# Patient Record
Sex: MALE
[De-identification: names, ages, dates, MRNs, and addresses within clinical notes are randomized per-mention and may not be internally consistent; named-entity substitution may affect disease eponyms.]

---

## 2018-06-08 ENCOUNTER — HOSPITAL ENCOUNTER (OUTPATIENT)
Dept: HOSPITAL 88 - OR | Age: 43
Discharge: HOME | End: 2018-06-08
Attending: INTERNAL MEDICINE
Payer: COMMERCIAL

## 2018-06-08 DIAGNOSIS — Z88.0: ICD-10-CM

## 2018-06-08 DIAGNOSIS — A04.72: Primary | ICD-10-CM

## 2018-06-08 DIAGNOSIS — K64.8: ICD-10-CM

## 2018-06-08 DIAGNOSIS — R03.0: ICD-10-CM

## 2018-06-08 DIAGNOSIS — Z80.0: ICD-10-CM

## 2018-06-08 DIAGNOSIS — K62.89: ICD-10-CM

## 2018-06-08 DIAGNOSIS — Z01.810: ICD-10-CM

## 2018-06-08 LAB — LACTOFERRIN STL QL: POSITIVE

## 2018-06-08 PROCEDURE — 87045 FECES CULTURE AEROBIC BACT: CPT

## 2018-06-08 PROCEDURE — 87177 OVA AND PARASITES SMEARS: CPT

## 2018-06-08 PROCEDURE — 45378 DIAGNOSTIC COLONOSCOPY: CPT

## 2018-06-08 PROCEDURE — 83630 LACTOFERRIN FECAL (QUAL): CPT

## 2018-06-08 PROCEDURE — 87493 C DIFF AMPLIFIED PROBE: CPT

## 2018-06-08 PROCEDURE — 45380 COLONOSCOPY AND BIOPSY: CPT

## 2018-06-08 PROCEDURE — 83993 ASSAY FOR CALPROTECTIN FECAL: CPT

## 2018-06-08 PROCEDURE — 93005 ELECTROCARDIOGRAM TRACING: CPT

## 2018-06-08 PROCEDURE — 87328 CRYPTOSPORIDIUM AG IA: CPT

## 2018-06-09 NOTE — OPERATIVE REPORT
DATE OF PROCEDURE:  June 08, 2018 



REFERRING PHYSICIAN:  Dr. Stoney Spivey 



PROCEDURE PERFORMED:  Colonoscopy with biopsies.  



INDICATIONS FOR COLONOSCOPY:  Diarrhea, lower abdominal pain.



MEDICATION:  Patient was done under MAC.  Please see anesthesiologist's 

note.



PROCEDURE:  With patient in left lateral decubitus position, flexible 

fiberoptic Olympus colonoscope was inserted into the rectum with ease and 

advanced all the way to the cecum.  Pseudomembranes were noted pretty much 

throughout the colon.  The ileocecal valve was intubated and scope was 

advanced into the terminal ileum.  Biopsies were obtained.  The scope was 

then withdrawn back into the colon.  It was then withdrawn slowly and 

random biopsies were obtained from the colon.  The rectum was diffusely 

inflamed and biopsies were obtained.  The scope was then retroflexed into 

the distal rectum and small internal hemorrhoids were noted none of which 

was actively bleeding.  The scope was then straightened out.  It was 

subsequently withdrawn after securing an adequate stool specimen that was 

sent for the appropriate stool studies.  Patient tolerated the procedure 

well.



IMPRESSIONS  

1. Diffuse pseudomembranous colitis.

2. Proctitis, biopsies obtained.

3. Internal hemorrhoids none actively bleeding.



PLAN

1. Follow up histology.  

2. Follow up stool studies.  

3. Start vancomycin 500 mg 1 p.o. q.6 h. 

 

4. Continue Flagyl 500 mg p.o. q.6 h.  











DD:  06/08/2018 16:54

DT:  06/08/2018 17:15

Job#:  [id number]

## 2018-06-10 LAB — C DIFFICILE TOXIN A&B AMP PROB: (no result)
